# Patient Record
(demographics unavailable — no encounter records)

---

## 2020-04-07 NOTE — DIAGNOSTIC IMAGING REPORT
EXAM: CT Chest WITH contrast 4/7/2020 5:01 PM

INDICATION:      

^pe protocol

^[id number]

^1700

COMPARISON: None.



TECHNIQUE:  Spiral CT images of the chest were performed from the lung apices

through the level of the adrenal glands after the IV contrast administration. 

Thin section reconstructions were obtained with special concentration on the

pulmonary arteries.

            IV CONTRAST: 100 mL of Isovue-370

            ORAL CONTRAST: None

            

COMPLICATIONS: None



RADIATION DOSE:

     Total DLP: 588.4 mGy*cm

     Estimated effective dose: (DLP x 0.015 x size factor) mSv

     CTDIvol has been reviewed. It is below the limits set by the Radiation

Protocol Committee (RPC).



     

FINDINGS:



LINES/ TUBES: None.



PULMONARY ARTERIES: No filling defects are identified in the main, right or

left pulmonary arteries to their   segmental and subsegmental levels, to

suggest pulmonary embolism.  The main pulmonary artery is normal in size,

measuring 2.5 cm in diameter.



LUNGS AND AIRWAYS:  Mild linear scarring in the left lower lobe. Indeterminate

4 mm solid nodule in the right middle lobe on series 4, image 59. No

consolidations or pulmonary edema. Airways are normal.



PLEURA: The pleural spaces are clear.



HEART AND MEDIASTINUM: The thyroid gland is normal.  No mediastinal, hilar or

axillary lymphadenopathy.  The heart is normal in size. There is no pericardial

effusion.       The thoracic aorta and pulmonary arteries are unremarkable.



UPPER ABDOMEN: Unremarkable.



BONES: The visualized bony thorax is within normal limits.



SOFT TISSUES: Unremarkable.



IMPRESSION: 

No pulmonary embolism.









Signed by: Dr. Rhea Rodriguez M.D. on 4/7/2020 6:35 PM

## 2020-04-11 NOTE — XMS REPORT
Patient Summary Document

                             Created on: 2020



MARY ZAYAS

External Reference #: [id number]

: 1994

Sex: Female



Demographics







                          Address                   [address]

[place]  [address]

 

                          Home Phone                [phone]

 

                          Preferred Language        Unknown

 

                          Marital Status            Unknown

 

                          Adventist Affiliation     Unknown

 

                          Race                      Unknown

 

                          Ethnic Group              Unknown





Author







                          Author                    [facility]

 

                          Organization              [facility]

 

                          Address                   Unknown

 

                          Phone                     Unavailable







Support







                Name            Relationship    Address         Phone

 

                    ZACH ZAYAS    PRS                 [address] [place], [place]  [id number] [phone]

 

                    LAWRENCE ZAYAS    PRS                 [address]

[place]  [address] [phone]







Care Team Providers







                    Care Team Member Name    Role                Phone

 

                    NAHUN BOJORQUEZ    Unavailable         Unavailable







Payers







             Payer Name    Policy Type    Policy Number    Effective Date    Expiration Date







Problems

This patient has no known problems.



Allergies, Adverse Reactions, Alerts







          Allergy Name    Allergy Type    Status    Severity    Reaction(s)    Onset Date    Inactive 

Date                      Treating Clinician        Comments

 

        diphenhydramine    DA      Active    MI              2017 00:00:00                     

 

        vancomycin    DA      Active    MI              2017 00:00:00                     







Medications

This patient has no known medications.



Results







           Test Description    Test Time    Test Comments    Text Results    Atomic Results    Result

 Comments

 

                CT CHEST W      2020 18:32:00                                                                

                                          [address] [address]     
Patient Name: MARY ZAYAS                                   MR #: 
[id number]                     : 1994                                  
Age/Sex: 26/F  Acct #: [id number]                              Req #: [id number]
[id number]  Adm Physician:                                                      
Ordered by: TIMOTHY EDUARDO NP                            Report #: [id number]  
     Location: ER                                      Room/Bed:                
    
___________________________________________________________________________________________________
   Procedure: [id number] CT/CT CHEST W  Exam Date: 20                      
     Exam Time: 1700                                              REPORT STATUS:
Signed    EXAM: CT Chest WITH contrast 2020 5:01 PM   INDICATION:          
pe protocol    [id number]    170   COMPARISON: None.      TECHNIQUE:  Spiral CT 
images of the chest were performed from the lung apices   through the level of 
the adrenal glands after the IV contrast administration.    Thin section 
reconstructions were obtained with special concentration on the   pulmonary 
arteries.               IV CONTRAST: 100 mL of Isovue-370               ORAL 
CONTRAST: None                  COMPLICATIONS: None      RADIATION DOSE:        
Total DLP: 588.4 mGy*cm        Estimated effective dose: (DLP x 0.015 x size 
factor) mSv        CTDIvol has been reviewed. It is below the limits set by the 
Radiation   Protocol Committee (RPC).              FINDINGS:      LINES/ TUBES: 
None.      PULMONARY ARTERIES: No filling defects are identified in the main, 
right or   left pulmonary arteries to their   segmental and subsegmental levels,
to   suggest pulmonary embolism.  The main pulmonary artery is normal in size,  
measuring 2.5 cm in diameter.      LUNGS AND AIRWAYS:  Mild linear scarring in 
the left lower lobe. Indeterminate   4 mm solid nodule in the right middle lobe 
on series 4, image 59. No   consolidations or pulmonary edema. Airways are 
normal.      PLEURA: The pleural spaces are clear.      HEART AND MEDIASTINUM: 
The thyroid gland is normal.  No mediastinal, hilar or   axillary 
lymphadenopathy.  The heart is normal in size. There is no pericardial   
effusion.       The thoracic aorta and pulmonary arteries are unremarkable.     
UPPER ABDOMEN: Unremarkable.      BONES: The visualized bony thorax is within 
normal limits.      SOFT TISSUES: Unremarkable.      IMPRESSION:    No pulmonary
embolism.               Signed by: Dr. Rhea Ramos M.D. on 2020
6:35 PM        Dictated By: RHEA RAMOS MD  Electronically Signed 
By: RHEA RAMOS MD on 20  Transcribed By: LUIS M on 
20       COPY TO:   TIMOTHY EDUARDO NP              

 

                - CT HEAD/BRAIN W/O CONT    2019-04-10 20:09:00                      Name: MARY ZAYAS       

            [facility]                     : 1994 Age/S: 25  / F   
     [address]                Unit #: [id number]     Loc:               
[place]  [address]              Phys: MATEO FOFANA MD                          
                           Acct: [id number]  Dis Date:               Status: 
REG ER                                  PHONE #: [phone]     Exam Date: 
04/10/2019  2007                     FAX #: [phone]      Reason: RUE 
weakness                                        EXAMS:                          
                    CPT CODE:      [id number] CT HEAD/BRAIN W/O CONT             
       [id number]                            REASON FOR EXAM: RUE weakness           
   EXAM ORDER DATE: 4/10/2019 7:30 PM               Ordering M.D.: MATEO FOFANA MD               PROCEDURE:  - CT HEAD/BRAIN W/O CONT               COMPARISON: 
             FINDINGS: CT images of the brain were obtained without IV contrast.
       Dose modulation, iterative reconstruction, and/or weight based       
adjustment of the MA/KV was utilized to reduce the radiation dose to       as 
low as reasonably achievable.                The brain parenchyma is within 
normal limits. The gray-white matter       delineation is unremarkable. The 
ventricles, cisterns, and sulci are       unremarkable. There is no evidence of 
hemorrhage, mass, mass effect.        There is no evidence of acute or old  
infarct. The calvarium is       intact.                 IMPRESSION: Unremarkable
brain.                    ** Electronically Signed by GARRETT Sosa on 04/10/2019
at  **                      Reported and signed by: Travis Sosa M.D.           
   CC: MATEO FOFANA MD                                                           
                                                              
Technologist:RT RICHARD(R)      CT    CTDI:        DLP:        Trnscb 
Date/Time: 04/10/2019 () AmanVTL                        Orig Print D/T: S:
04/10/2019 ()       CTDI:          DLP:          PAGE  1                    
  Signed Report                                    

 

                BASIC METABOLIC PANEL    2019-04-10 19:53:00                      

 

   

 

                SODIUM (test code=NA)    143 mmol/L      136-145          

 

                POTASSIUM (test code=K)    3.7 mmol/L      3.5-5.1          

 

                CHLORIDE (test code=CL)    108.0 mmol/L               

 

                CARBON DIOXIDE (test code=CO2)    27.0 mmol/L     21-32            

 

                ANION GAP (test code=GAP)    11.7            10-20            

 

                GLUCOSE (test code=GLU)    73 mg/dL                   

 

                BLOOD UREA NITROGEN (test code=BUN)    12 mg/dL        7-18             

 

                GLOMERULAR FILTRATION RATE (test code=GFR)    > 60 mL/min     >=60            Estimated GFR by 

using Modified MDRD formula.Chronic kidney disease is defined as either kidney 
damageor GFR <60 mL/min/1.73 m2 for >3 months.

 

                CREATININE (test code=CREAT)    0.80 mg/dL      0.55-1.02       **Note change in reference 

range due to change in reagent.**

 

                BUN/CREATININE RATIO (test code=BUN/CREA)    15.0            10-20            

 

                CALCIUM (test code=CA)    9.5 mg/dL       8.5-10.1         





MAGNESIUM2019-04-10 19:53:00* 





                Test Item       Value           Reference Range    Comments

 

                MAGNESIUM (test code=MAG)    2.0 mg/dL       1.8-2.4          





HCG SERUM QUAL2019-04-10 19:53:00* 





                Test Item       Value           Reference Range    Comments

 

                HCG SERUM QUAL (test code=HCGQL)    NEGATIVE        NEGATIVE        This HCGQL test is NOT applicable

 for MALE patients.Check with nurse about probable order error.If Tumor Marker 
Test needed, nurse should order test "HCGTU"(Test 
#550.06903)-------------------------------------------------------





THYROID STIMULATING HORMONE2019-04-10 19:53:00* 





                Test Item       Value           Reference Range    Comments

 

                THYROID STIMULATING HORMONE (test code=TSH)    1.900 uIU/mL    0.36-3.74       TSH REFERENCE

 RANGES:  EUTHYROID:     0.35 - 4.3 mIU/mL                       HYPO     :     
> 5.5      mIU/mL                       HYPER    :     < 0.35     mIU/mL





LIUVYRWZ--51-10 19:53:00* 





                Test Item       Value           Reference Range    Comments

 

                TROPONIN-I (test code=TROPI)    <0.015 ng/mL    0-0.045          





BASIC METABOLIC PANEL2019-04-10 19:22:00* 





                Test Item       Value           Reference Range    Comments

 

                SODIUM (test code=NA)    143 mmol/L      136-145          

 

                POTASSIUM (test code=K)    3.7 mmol/L      3.5-5.1          

 

                CHLORIDE (test code=CL)    108.0 mmol/L               

 

                CARBON DIOXIDE (test code=CO2)    27.0 mmol/L     21-32            

 

                ANION GAP (test code=GAP)    11.7            10-20            

 

                GLUCOSE (test code=GLU)    73 mg/dL                   

 

                BLOOD UREA NITROGEN (test code=BUN)    12 mg/dL        7-18             

 

                GLOMERULAR FILTRATION RATE (test code=GFR)    > 60 mL/min     >=60            Estimated GFR by 

using Modified MDRD formula.Chronic kidney disease is defined as either kidney 
damageor GFR <60 mL/min/1.73 m2 for >3 months.

 

                CREATININE (test code=CREAT)    0.80 mg/dL      0.55-1.02       **Note change in reference 

range due to change in reagent.**

 

                BUN/CREATININE RATIO (test code=BUN/CREA)    15.0            10-20            

 

                CALCIUM (test code=CA)    9.5 mg/dL       8.5-10.1         





MAGNESIUM2019-04-10 19:22:00* 





                Test Item       Value           Reference Range    Comments

 

                MAGNESIUM (test code=MAG)    2.0 mg/dL       1.8-2.4          





HCG SERUM QUAL2019-04-10 19:22:00* 





                Test Item       Value           Reference Range    Comments

 

                HCG SERUM QUAL (test code=HCGQL)                    NEGATIVE         





THYROID STIMULATING HORMONE2019-04-10 19:22:00* 





                Test Item       Value           Reference Range    Comments

 

                THYROID STIMULATING HORMONE (test code=TSH)    1.900 uIU/mL    0.36-3.74       TSH REFERENCE

 RANGES:  EUTHYROID:     0.35 - 4.3 mIU/mL                       HYPO     :     
> 5.5      mIU/mL                       HYPER    :     < 0.35     mIU/mL





NPSUSSZI--81-10 19:22:00* 





                Test Item       Value           Reference Range    Comments

 

                TROPONIN-I (test code=TROPI)    <0.015 ng/mL    0-0.045          





- XR CHEST 1 -04-10 19:03:00 FAX:         MATEO FOFANA MD                   
   [place]: B   St: PRE----------
---------------------------------------------------------------------  Name:   MARY MARIE                  [facility]                     : 19
94  Age/S: 25/F           [address] [facility]                Unit #: [id number]    
 Loc: [place]  [address]              Phys: MATEO FOFANA MD       
                                              Acct: [id number] Dis Date:      
        Status: PRE ER                                 PHONE #: [phone]    
Exam Date:     04/10/2019     1850                   FAX #: [phone]     
Reason: CHEST PAIN                                         EXAMS:               
                               CPT CODE:      [id number] XR CHEST 1 V            
                  [id number]                            REASON FOR EXAM: CHEST PAIN  
            EXAM ORDER DATE: 4/10/2019 6:13 PM               Ordering M.D.: MATEO FOFANA MD                PROCEDURE:  - XR CHEST 1 V               COMPARISON:  
            FINDINGS:  Portable AP frontal view of the chest obtained at 6:50 PM
      shows clear lungs without evidence of consolidation. There is no       
evidence of effusion. The heart size is within normal limits.       Pulmonary 
vasculatures are unremarkable.                  IMPRESSION: No active disease.  
       ** Electronically Signed by GARRETT Sosa on 04/10/2019 at 1903 **        
             Reported and signed by: Travis Sosa M.D.                       CC: 
MATEO FOFANA MD                                                                  
                                                       Technologist: Megha MIRANDA(R)                                Trnscrd Date/Time/By: 04/10/2019 (
) : By: AmanVTL           Orig Print D/T: S: 04/10/2019 ()            
            PAGE  1                       Signed Report                         
     BASIC METABOLIC PANEL2019-04-10 18:59:00* 





                Test Item       Value           Reference Range    Comments

 

                SODIUM (test code=NA)    143 mmol/L      136-145          

 

                POTASSIUM (test code=K)    3.7 mmol/L      3.5-5.1          

 

                CHLORIDE (test code=CL)    108.0 mmol/L               

 

                CARBON DIOXIDE (test code=CO2)     mmol/L         21-32            

 

                ANION GAP (test code=GAP)                    10-20            

 

                GLUCOSE (test code=GLU)     mg/dL                     

 

                BLOOD UREA NITROGEN (test code=BUN)     mg/dL          7-18             

 

                GLOMERULAR FILTRATION RATE (test code=GFR)     mL/min         >=60             

 

                CREATININE (test code=CREAT)     mg/dL          0.55-1.02        

 

                BUN/CREATININE RATIO (test code=BUN/CREA)                    10-20            

 

                CALCIUM (test code=CA)     mg/dL          8.5-10.1         





MAGNESIUM2019-04-10 18:59:00* 





                Test Item       Value           Reference Range    Comments

 

                MAGNESIUM (test code=MAG)     mg/dL          1.8-2.4          





HCG SERUM QUAL2019-04-10 18:59:00* 





                Test Item       Value           Reference Range    Comments

 

                HCG SERUM QUAL (test code=HCGQL)                    NEGATIVE         





THYROID STIMULATING HORMONE2019-04-10 18:59:00* 





                Test Item       Value           Reference Range    Comments

 

                THYROID STIMULATING HORMONE (test code=TSH)     uIU/mL         0.36-3.74        





OKALQRDC--25-10 18:59:00* 





                Test Item       Value           Reference Range    Comments

 

                TROPONIN-I (test code=TROPI)     ng/mL          0-0.045          





CBC W/O DIFF2019-04-10 18:34:00* 





                Test Item       Value           Reference Range    Comments

 

                WHITE BLOOD CELL (test code=WBC)    11.8 K/mm3      4.5-12.5         

 

                RED BLOOD CELL (test code=RBC)    4.77 mill/mm3    3.7-5.2          

 

                HEMOGLOBIN (test code=HGB)    13.5 gram/dL    11.5-15.5        

 

                HEMATOCRIT (test code=HCT)    41.5 %          36.0-46.0        

 

                MEAN CELL VOLUME (test code=MCV)    87.0 fL         80-98            

 

                MEAN CELL HGB (test code=MCH)    28.3 picogram    27.0-33.0        

 

                MEAN CELL HGB CONCETRATION (test code=MCHC)    32.5 gram/dL    33.0-36.0        

 

                RED CELL DISTRIBUTION WIDTH (test code=RDW)    12.6 %          11.6-16.2        

 

                PLATELET COUNT (test code=PLT)    197 K/mm3       150-450          

 

                MEAN PLATELET VOLUME (test code=MPV)    12.5 fL         6.7-11.0         





CBC W/O DIFF2019-04-10 18:33:00* 





                Test Item       Value           Reference Range    Comments

 

                WHITE BLOOD CELL (test code=WBC)     K/mm3          4.5-12.5         

 

                RED BLOOD CELL (test code=RBC)     mill/mm3       3.7-5.2          

 

                HEMOGLOBIN (test code=HGB)    13.5 gram/dL    11.5-15.5        

 

                HEMATOCRIT (test code=HCT)    41.5 %          36.0-46.0        

 

                MEAN CELL VOLUME (test code=MCV)     fL             80-98            

 

                MEAN CELL HGB (test code=MCH)     picogram       27.0-33.0        

 

                MEAN CELL HGB CONCETRATION (test code=MCHC)     gram/dL        33.0-36.0        

 

                RED CELL DISTRIBUTION WIDTH (test code=RDW)     %              11.6-16.2        

 

                PLATELET COUNT (test code=PLT)     K/mm3          150-450          

 

                MEAN PLATELET VOLUME (test code=MPV)     fL             6.7-11.0